# Patient Record
Sex: FEMALE | URBAN - METROPOLITAN AREA
[De-identification: names, ages, dates, MRNs, and addresses within clinical notes are randomized per-mention and may not be internally consistent; named-entity substitution may affect disease eponyms.]

---

## 2021-07-19 ENCOUNTER — APPOINTMENT (RX ONLY)
Dept: URBAN - METROPOLITAN AREA CLINIC 126 | Facility: CLINIC | Age: 61
Setting detail: DERMATOLOGY
End: 2021-07-19

## 2021-07-19 DIAGNOSIS — R21 RASH AND OTHER NONSPECIFIC SKIN ERUPTION: ICD-10-CM | Status: WORSENING

## 2021-07-19 PROCEDURE — ? PHOTO-DOCUMENTATION

## 2021-07-19 PROCEDURE — ? ORDER TESTS

## 2021-07-19 PROCEDURE — ? COUNSELING

## 2021-07-19 PROCEDURE — ? ADDITIONAL NOTES

## 2021-07-19 PROCEDURE — 99204 OFFICE O/P NEW MOD 45 MIN: CPT

## 2021-07-19 NOTE — PROCEDURE: ADDITIONAL NOTES
Additional Notes: patient advised to continue doxycycline and valtrex and to present to the emergency room from our office as she may need IV antibiotics, pain medication, and a work-up.  Patient agrees.

## 2021-07-19 NOTE — PROCEDURE: ADDITIONAL NOTES
Additional Notes: Recommend patient go to the emergency room again as she is having muscle aches, fluid filled lesions, and history of lupus and blood clots.  Recommend [facility] as patient did not want to go back to [facility] Additional Notes: Recommend patient go to the emergency room again as she is having muscle aches, fluid filled lesions, and history of lupus and blood clots.  Recommend [facility] as patient did not want to go back to [facility]

## 2021-08-23 ENCOUNTER — APPOINTMENT (RX ONLY)
Dept: URBAN - METROPOLITAN AREA CLINIC 126 | Facility: CLINIC | Age: 61
Setting detail: DERMATOLOGY
End: 2021-08-23

## 2021-08-23 DIAGNOSIS — B95.61 METHICILLIN SUSCEPTIBLE STAPHYLOCOCCUS AUREUS INFECTION AS THE CAUSE OF DISEASES CLASSIFIED ELSEWHERE: ICD-10-CM | Status: RESOLVED

## 2021-08-23 DIAGNOSIS — B02.7 DISSEMINATED ZOSTER: ICD-10-CM | Status: RESOLVED

## 2021-08-23 DIAGNOSIS — Z48.817 ENCOUNTER FOR SURGICAL AFTERCARE FOLLOWING SURGERY ON THE SKIN AND SUBCUTANEOUS TISSUE: ICD-10-CM

## 2021-08-23 PROCEDURE — 99212 OFFICE O/P EST SF 10 MIN: CPT

## 2021-08-23 PROCEDURE — ? COUNSELING

## 2021-08-23 PROCEDURE — ? DRESSING CHANGE (GLOBAL PERIOD)

## 2021-08-23 PROCEDURE — ? ADDITIONAL NOTES

## 2021-08-23 PROCEDURE — ? PHOTO-DOCUMENTATION

## 2021-08-23 ASSESSMENT — LOCATION DETAILED DESCRIPTION DERM: LOCATION DETAILED: LEFT LATERAL TRAPEZIAL NECK

## 2021-08-23 ASSESSMENT — LOCATION ZONE DERM: LOCATION ZONE: NECK

## 2021-08-23 ASSESSMENT — LOCATION SIMPLE DESCRIPTION DERM: LOCATION SIMPLE: POSTERIOR NECK

## 2021-08-23 NOTE — PROCEDURE: COUNSELING
Detail Level: Simple Proceed with PT as scheduled  Follow up with Dr. Leonard regarding injections  If your pain does not improve please follow up to discuss obtaining repeat advanced imaging.

## 2021-08-23 NOTE — PROCEDURE: COUNSELING
Patient Specific Counseling (Will Not Stick From Patient To Patient): Follow up with [facility] Sept 01, 2021 as scheduled.  Conitnue polysporin, aquaphor, and daily dressing changes as instructed by [facility].  Site looks much improved with graft.  No signs of infection.  Patient is on medication to deal with post herpetic neuralgia.  Left thigh graft site is covered and pt told at [facility] not to remove the cover.  It did fall off before she put on a new one and patient took photos of site.  Area is healing well without infection per photos. Patient Specific Counseling (Will Not Stick From Patient To Patient): Follow up with [facility] Sept 01, 2021 as scheduled.  Conitnue polysporin, aquaphor, and daily dressing changes as instructed by [facility].  Site looks much improved with graft.  No signs of infection.  Patient is on medication to deal with post herpetic neuralgia.  Left thigh graft site is covered and pt told at [facility] not to remove the cover.  It did fall off before she put on a new one and patient took photos of site.  Area is healing well without infection per photos.

## 2021-08-23 NOTE — PROCEDURE: DRESSING CHANGE (GLOBAL PERIOD)
Detail Level: Detailed
Add 57199 Cpt? (Important Note: In 2017 The Use Of 68593 Is Being Tracked By Cms To Determine Future Global Period Reimbursement For Global Periods): no

## 2021-08-23 NOTE — PROCEDURE: ADDITIONAL NOTES
Render Risk Assessment In Note?: no
Detail Level: Simple
Additional Notes: Patient was treated with IV antibiotics while in the hospital.
Additional Notes: Patient consent was obtained to proceed with the visit and recommended plan of care after discussion of all risks and benefits, including the risks of COVID-19 exposure.